# Patient Record
Sex: MALE | Race: WHITE | Employment: PART TIME | URBAN - METROPOLITAN AREA
[De-identification: names, ages, dates, MRNs, and addresses within clinical notes are randomized per-mention and may not be internally consistent; named-entity substitution may affect disease eponyms.]

---

## 2018-11-06 ENCOUNTER — OFFICE VISIT (OUTPATIENT)
Dept: CARDIOLOGY CLINIC | Age: 78
End: 2018-11-06
Payer: COMMERCIAL

## 2018-11-06 VITALS
SYSTOLIC BLOOD PRESSURE: 122 MMHG | RESPIRATION RATE: 16 BRPM | HEART RATE: 60 BPM | HEIGHT: 66 IN | BODY MASS INDEX: 25.05 KG/M2 | WEIGHT: 155.9 LBS | DIASTOLIC BLOOD PRESSURE: 78 MMHG

## 2018-11-06 DIAGNOSIS — I25.119 CORONARY ARTERY DISEASE WITH ANGINA PECTORIS, UNSPECIFIED VESSEL OR LESION TYPE, UNSPECIFIED WHETHER NATIVE OR TRANSPLANTED HEART (HCC): Primary | ICD-10-CM

## 2018-11-06 PROCEDURE — 99213 OFFICE O/P EST LOW 20 MIN: CPT | Performed by: INTERNAL MEDICINE

## 2018-11-06 PROCEDURE — 93000 ELECTROCARDIOGRAM COMPLETE: CPT | Performed by: INTERNAL MEDICINE

## 2018-11-06 RX ORDER — AZELASTINE HYDROCHLORIDE 0.5 MG/ML
SOLUTION/ DROPS OPHTHALMIC
COMMUNITY
Start: 2018-10-03 | End: 2020-06-17 | Stop reason: ALTCHOICE

## 2018-11-06 NOTE — PROGRESS NOTES
allergies, problem list and results of all previously ordered testing were reviewed at today's visit.   Luis Rosas MD  [facility]) Cardiology

## 2019-08-20 ENCOUNTER — APPOINTMENT (OUTPATIENT)
Dept: GENERAL RADIOLOGY | Age: 79
End: 2019-08-20
Payer: MEDICARE

## 2019-08-20 ENCOUNTER — HOSPITAL ENCOUNTER (EMERGENCY)
Age: 79
Discharge: HOME | End: 2019-08-20
Attending: EMERGENCY MEDICINE
Payer: MEDICARE

## 2019-08-20 VITALS
RESPIRATION RATE: 20 BRPM | HEIGHT: 66 IN | OXYGEN SATURATION: 91 % | WEIGHT: 151 LBS | DIASTOLIC BLOOD PRESSURE: 74 MMHG | SYSTOLIC BLOOD PRESSURE: 131 MMHG | HEART RATE: 88 BPM | BODY MASS INDEX: 24.27 KG/M2 | TEMPERATURE: 98.1 F

## 2019-08-20 DIAGNOSIS — J44.1 COPD EXACERBATION (HCC): Primary | ICD-10-CM

## 2019-08-20 PROCEDURE — 93005 ELECTROCARDIOGRAM TRACING: CPT | Performed by: EMERGENCY MEDICINE

## 2019-08-20 PROCEDURE — 6370000000 HC RX 637 (ALT 250 FOR IP)

## 2019-08-20 PROCEDURE — 99285 EMERGENCY DEPT VISIT HI MDM: CPT

## 2019-08-20 PROCEDURE — 6370000000 HC RX 637 (ALT 250 FOR IP): Performed by: EMERGENCY MEDICINE

## 2019-08-20 PROCEDURE — 71046 X-RAY EXAM CHEST 2 VIEWS: CPT

## 2019-08-20 RX ORDER — DOXYCYCLINE HYCLATE 100 MG
100 TABLET ORAL 2 TIMES DAILY
Qty: 20 TABLET | Refills: 0 | Status: SHIPPED | OUTPATIENT
Start: 2019-08-20 | End: 2019-08-30

## 2019-08-20 RX ORDER — IPRATROPIUM BROMIDE AND ALBUTEROL SULFATE 2.5; .5 MG/3ML; MG/3ML
SOLUTION RESPIRATORY (INHALATION)
Status: COMPLETED
Start: 2019-08-20 | End: 2019-08-20

## 2019-08-20 RX ORDER — PREDNISONE 10 MG/1
TABLET ORAL
Qty: 10 TABLET | Refills: 0 | Status: SHIPPED | OUTPATIENT
Start: 2019-08-20 | End: 2019-08-30

## 2019-08-20 RX ORDER — ALBUTEROL SULFATE 2.5 MG/3ML
2.5 SOLUTION RESPIRATORY (INHALATION) EVERY 6 HOURS PRN
Qty: 120 EACH | Refills: 3 | Status: SHIPPED | OUTPATIENT
Start: 2019-08-20

## 2019-08-20 RX ORDER — PREDNISONE 20 MG/1
60 TABLET ORAL ONCE
Status: COMPLETED | OUTPATIENT
Start: 2019-08-20 | End: 2019-08-20

## 2019-08-20 RX ORDER — DOXYCYCLINE HYCLATE 100 MG/1
100 CAPSULE ORAL ONCE
Status: COMPLETED | OUTPATIENT
Start: 2019-08-20 | End: 2019-08-20

## 2019-08-20 RX ORDER — IPRATROPIUM BROMIDE AND ALBUTEROL SULFATE 2.5; .5 MG/3ML; MG/3ML
1 SOLUTION RESPIRATORY (INHALATION) ONCE
Status: COMPLETED | OUTPATIENT
Start: 2019-08-20 | End: 2019-08-20

## 2019-08-20 RX ADMIN — PREDNISONE 60 MG: 20 TABLET ORAL at 13:56

## 2019-08-20 RX ADMIN — IPRATROPIUM BROMIDE AND ALBUTEROL SULFATE 1 AMPULE: 2.5; .5 SOLUTION RESPIRATORY (INHALATION) at 13:45

## 2019-08-20 RX ADMIN — DOXYCYCLINE HYCLATE 100 MG: 100 CAPSULE ORAL at 14:26

## 2019-08-20 RX ADMIN — IPRATROPIUM BROMIDE AND ALBUTEROL SULFATE 1 AMPULE: .5; 3 SOLUTION RESPIRATORY (INHALATION) at 13:45

## 2019-08-21 LAB
EKG ATRIAL RATE: 89 BPM
EKG P AXIS: 25 DEGREES
EKG P-R INTERVAL: 154 MS
EKG Q-T INTERVAL: 368 MS
EKG QRS DURATION: 88 MS
EKG QTC CALCULATION (BAZETT): 447 MS
EKG R AXIS: 34 DEGREES
EKG T AXIS: 12 DEGREES
EKG VENTRICULAR RATE: 89 BPM

## 2019-08-21 PROCEDURE — 93010 ELECTROCARDIOGRAM REPORT: CPT | Performed by: INTERNAL MEDICINE

## 2020-05-28 ENCOUNTER — TELEPHONE (OUTPATIENT)
Dept: ADMINISTRATIVE | Age: 80
End: 2020-05-28

## 2020-06-17 ENCOUNTER — OFFICE VISIT (OUTPATIENT)
Dept: CARDIOLOGY CLINIC | Age: 80
End: 2020-06-17
Payer: MEDICARE

## 2020-06-17 VITALS
HEART RATE: 63 BPM | BODY MASS INDEX: 24.85 KG/M2 | HEIGHT: 66 IN | SYSTOLIC BLOOD PRESSURE: 118 MMHG | WEIGHT: 154.6 LBS | DIASTOLIC BLOOD PRESSURE: 80 MMHG | TEMPERATURE: 97.2 F

## 2020-06-17 PROCEDURE — 1123F ACP DISCUSS/DSCN MKR DOCD: CPT | Performed by: INTERNAL MEDICINE

## 2020-06-17 PROCEDURE — 99214 OFFICE O/P EST MOD 30 MIN: CPT | Performed by: INTERNAL MEDICINE

## 2020-06-17 PROCEDURE — G8427 DOCREV CUR MEDS BY ELIG CLIN: HCPCS | Performed by: INTERNAL MEDICINE

## 2020-06-17 PROCEDURE — 1036F TOBACCO NON-USER: CPT | Performed by: INTERNAL MEDICINE

## 2020-06-17 PROCEDURE — 4040F PNEUMOC VAC/ADMIN/RCVD: CPT | Performed by: INTERNAL MEDICINE

## 2020-06-17 PROCEDURE — G8420 CALC BMI NORM PARAMETERS: HCPCS | Performed by: INTERNAL MEDICINE

## 2020-06-17 PROCEDURE — 93000 ELECTROCARDIOGRAM COMPLETE: CPT | Performed by: INTERNAL MEDICINE

## 2020-06-17 NOTE — PATIENT INSTRUCTIONS
1. ontinue current medical regimen. 2. We will obtain latest lipid panel results from Dr. Aki Hernandez office  3. Will schedule him for an echocardiogram to evaluate exertional dyspnea and to rule out LV dysfunction. 4. He will follow up with me in one year.

## 2020-06-17 NOTE — PROGRESS NOTES
OUTPATIENT CARDIOLOGY FOLLOW-UP    Name: Ros Del Cid    Age: 70 y.o. Primary Care Physician: Ariana Valencia    Date of Service: 6/17/2020    Chief Complaint:   Chief Complaint   Patient presents with    Coronary Artery Disease     Patient complains of sob. Interim History:   Mr. Hosea Arredondo is a 56-year-old gentleman history of hypertension, hyperlipidemia, coronary artery disease status post acute MI presented in normal 2010. He underwent cardiac cath and followed by PCI and stent to RCA. He had an echocardiogram in August 2011 which showed mild LV dysfunction with incomplete to recover of LV function on subsequent echocardiograms. He has had a acute diverticulitis with the perforated colon underwent the surgery in November 2014. He was also diagnosed with COPD and that makes him shortness of breath with exertion and feels better with inhalers. He quit smoking in 2007. He was told that his PSA was high on recent blood work. He is going to follow up at [facility] for that. He was seen in the office 11/6/2018., since his last visit, he was admitted to the hospital on 8/20/2019 with a COPD exacerbation. He noticed increasing dyspnea with exertion without orthopnea PND or leg edema. He thinks COPD is getting worse and is requiring increased frequency of inhalers. He is compliant with medications, as well as salt and fluid intake. He does not take any over-the-counter arthritis medications. No new cardiac complaints since last cardiology evaluation. She denies recent chest pain, SOB, palpitations, lightheadedness, dizziness, syncope, PND, or orthopnea. SR on EKG.     Review of Systems:   Cardiac: As per HPI  General: No fever, chills  Pulmonary: As per HPI  HEENT: No visual disturbances, difficult swallowing  GI: No nausea, vomiting  Endocrine: No thyroid disease or DM  Musculoskeletal: BECK[place] x 4, no focal motor deficits  Skin: Intact, no rashes  Neuro/Psych: No headache or seizures    Past Medical History:  Past Medical History:   Diagnosis Date    Atypical chest pain 11/5/10    presented to ER    CAD (coronary artery disease)     follows with dr. Kalyan [facility]) 2004    skin    COPD (chronic obstructive pulmonary disease) ([facility] [address]     H/O diverticulitis of colon     Hyperlipidemia     Hypertension     Preoperative clearance 10-14-14    Cardiac clearance from Dr. Antelmo Seals for OR 10-20-14 on chart    Travel foreign     no       Past Surgical History:  Past Surgical History:   Procedure Laterality Date    COLECTOMY  3/19/14    Dr. Lg Fermin, [facility], Kavya's procedure (sigmoid colectomy with end colostomy)    COLONOSCOPY      DIAGNOSTIC CARDIAC CATH LAB PROCEDURE  11/6/10    HERNIA REPAIR      OTHER SURGICAL HISTORY  10/20/14    lap reversal of colostomy       Family History:  Family History   Problem Relation Age of Onset    Heart Disease Father 61       Social History:  Social History     Socioeconomic History    Marital status:      Spouse name: Not on file    Number of children: Not on file    Years of education: Not on file    Highest education level: Not on file   Occupational History    Not on file   Social Needs    Financial resource strain: Not on file    Food insecurity     Worry: Not on file     Inability: Not on file    Transportation needs     Medical: Not on file     Non-medical: Not on file   Tobacco Use    Smoking status: Former Smoker     Packs/day: 1.00     Years: 50.00     Pack years: 50.00     Types: Cigarettes     Last attempt to quit: 2007     Years since quittin.4    Smokeless tobacco: Former User     Quit date: 2007   Substance and Sexual Activity    Alcohol use: Yes     Comment: socially    Drug use: No    Sexual activity: Not on file   Lifestyle    Physical activity     Days per week: Not on file     Minutes per session: Not on file    Stress: Not on file   Relationships    Social connections     Talks on phone: Not on file     Gets (70.1 kg)   BMI 24.95 kg/m²   Wt Readings from Last 3 Encounters:   06/17/20 154 lb 9.6 oz (70.1 kg)   08/20/19 151 lb (68.5 kg)   11/06/18 155 lb 14.4 oz (70.7 kg)     Appearance: Awake, alert and oriented x 3, no acute respiratory distress  Skin: Intact, no rash  Head: Normocephalic, atraumatic  Eyes: EOMI, no conjunctival erythema  ENMT: No pharyngeal erythema, MMM, no rhinorrhea  Neck: Supple, no elevated JVP, no carotid bruits  Lungs: Clear to auscultation bilaterally. No wheezes, rales, or rhonchi.   Cardiac: Regular rate and rhythm, +S1S2, no murmurs apparent  Abdomen: Soft, nontender, +bowel sounds  Extremities: Moves all extremities x 4, no lower extremity edema  Neurologic: No focal motor deficits apparent, normal mood and affect, alert and oriented x 3  Peripheral Pulses: Intact posterior tibial pulses bilaterally    Laboratory Tests:  Lab Results   Component Value Date    CREATININE 1.0 12/29/2015    BUN 20 12/29/2015     12/29/2015    K 4.8 12/29/2015     12/29/2015    CO2 28 12/29/2015     Lab Results   Component Value Date    MG 2.1 03/24/2014     Lab Results   Component Value Date    WBC 6.8 12/29/2015    HGB 14.2 12/29/2015    HCT 42.1 12/29/2015    MCV 89.6 12/29/2015     12/29/2015     Lab Results   Component Value Date    ALT 27 12/29/2015    AST 21 12/29/2015    ALKPHOS 100 12/29/2015    BILITOT 0.6 12/29/2015     Lab Results   Component Value Date    CKTOTAL 1,369 (H) 11/06/2010    CKMB 279.2 (H) 11/06/2010    TROPONINI 31.84 (H) 11/06/2010    TROPONINI 0.16 11/05/2010    TROPONINI 0.20 11/05/2010     Lab Results   Component Value Date    INR 1.2 03/20/2014    INR 1.0 03/19/2014    INR 1.0 11/06/2010    PROTIME 13.3 (H) 03/20/2014    PROTIME 12.0 03/19/2014    PROTIME 11.0 11/06/2010     Lab Results   Component Value Date    TSH 1.790 12/29/2015     Lab Results   Component Value Date    LABA1C 5.7 11/08/2010     No results found for: EAG  Lab Results   Component Value Date CHOL 145 12/29/2015    CHOL 131 11/08/2013    CHOL 178 11/05/2010     Lab Results   Component Value Date    TRIG 67 12/29/2015    TRIG 93 11/08/2013    TRIG 52 11/05/2010     Lab Results   Component Value Date    HDL 72 12/29/2015    HDL 66.0 11/08/2013    HDL 63.0 11/05/2010     Lab Results   Component Value Date    LDLCALC 60 12/29/2015    LDLCALC 46 11/08/2013    LDLCALC 105 (H) 11/05/2010     Lab Results   Component Value Date    LABVLDL 13 12/29/2015     No results found for: CHOLHDLRATIO    Cardiac Tests:  ECG: NSR, normal EKG, no change    Echocardiogram: 8/11/2011-LVEF 50%, trace to mild MR, stage I diastolic dysfunction, normal LV systolic function, and PA systolic pressure was normal.    Exercise nuclear stress test-10/14/2014: DTS-4, is a small fixed defect in the apical lateral wall suggestive of prior infarct, abnormal LV systolic function with an EF of 40%. Cardiac catheterization: 2011  The ASCVD Risk score (Lilibeth Aguirre, et al., 2013) failed to calculate for the following reasons:    Cannot find a previous HDL lab    Cannot find a previous total cholesterol lab        ASSESSMENT:  1. LAWRENCE,new secondary to COPD versus CHF. 2. CAD, status post acute MI underwent PCI and TONG to RCA in 2010  3. Mild LV dysfunction - resolved  4. COPD - stable  5. Hypertension well controlled  6. Hyperlipidemia on statin well controlled  7. Remote history of tobacco use  8. History of perforated diverticulitis status post surgery stable  9. Recent history of elevated PSA secondary to prostatitis. Plan:   1. Continue current medical regimen. 2. We will obtain latest lipid panel results from Dr. Hannah Marshall office  3. Will schedule him for an echocardiogram to evaluate exertional dyspnea and to rule out LV dysfunction. 4. He will follow up with me in one year. The patient's current medication list, allergies, problem list and results of all previously ordered testing were reviewed at today's visit.   Elly Xavier

## 2020-08-31 ENCOUNTER — TELEPHONE (OUTPATIENT)
Dept: CARDIOLOGY | Age: 80
End: 2020-08-31

## 2020-09-02 ENCOUNTER — HOSPITAL ENCOUNTER (OUTPATIENT)
Dept: CARDIOLOGY | Age: 80
Discharge: HOME | End: 2020-09-02
Payer: MEDICARE

## 2020-09-02 LAB
LV EF: 53 %
LVEF MODALITY: NORMAL

## 2020-09-02 PROCEDURE — 93306 TTE W/DOPPLER COMPLETE: CPT

## 2020-09-04 ENCOUNTER — TELEPHONE (OUTPATIENT)
Dept: CARDIOLOGY CLINIC | Age: 80
End: 2020-09-04

## 2020-09-04 NOTE — TELEPHONE ENCOUNTER
----- Message from Gigi Mares MD sent at 9/3/2020  5:08 PM EDT -----  His echocardiogram showed normal LV function mild stiff heart no significant valve lesions.

## 2021-08-29 ENCOUNTER — APPOINTMENT (OUTPATIENT)
Dept: CT IMAGING | Age: 81
End: 2021-08-29
Payer: MEDICARE

## 2021-08-29 ENCOUNTER — HOSPITAL ENCOUNTER (EMERGENCY)
Age: 81
Discharge: HOME | End: 2021-08-29
Attending: FAMILY MEDICINE
Payer: MEDICARE

## 2021-08-29 VITALS
DIASTOLIC BLOOD PRESSURE: 60 MMHG | TEMPERATURE: 97.4 F | BODY MASS INDEX: 24.91 KG/M2 | RESPIRATION RATE: 20 BRPM | SYSTOLIC BLOOD PRESSURE: 196 MMHG | OXYGEN SATURATION: 94 % | HEART RATE: 84 BPM | WEIGHT: 155 LBS | HEIGHT: 66 IN

## 2021-08-29 DIAGNOSIS — S20.212A CONTUSION OF RIB ON LEFT SIDE, INITIAL ENCOUNTER: Primary | ICD-10-CM

## 2021-08-29 PROCEDURE — 99283 EMERGENCY DEPT VISIT LOW MDM: CPT

## 2021-08-29 PROCEDURE — 71250 CT THORAX DX C-: CPT

## 2021-08-29 PROCEDURE — 6370000000 HC RX 637 (ALT 250 FOR IP): Performed by: FAMILY MEDICINE

## 2021-08-29 RX ORDER — ACETAMINOPHEN 500 MG
1000 TABLET ORAL ONCE
Status: COMPLETED | OUTPATIENT
Start: 2021-08-29 | End: 2021-08-29

## 2021-08-29 RX ORDER — IBUPROFEN 400 MG/1
400 TABLET ORAL ONCE
Status: COMPLETED | OUTPATIENT
Start: 2021-08-29 | End: 2021-08-29

## 2021-08-29 RX ADMIN — IBUPROFEN 400 MG: 400 TABLET, FILM COATED ORAL at 18:27

## 2021-08-29 RX ADMIN — ACETAMINOPHEN 1000 MG: 500 TABLET ORAL at 18:27

## 2021-08-29 ASSESSMENT — PAIN SCALES - GENERAL
PAINLEVEL_OUTOF10: 9
PAINLEVEL_OUTOF10: 9

## 2021-08-29 NOTE — ED PROVIDER NOTES
[address] [facility]  Department of Emergency Medicine   ED  Encounter Note  Admit Date/RoomTime: 2021  3:56 PM  ED Room:     NAME: Segundo Mccurdy  : 1940  MRN: [id number]     Chief Complaint:  Fall (tripped over dog last night and c/o left side back/rib pain landed on floor, no loc, no head injury, no other c/o)    History of Present Illness        Segundo Mccurdy is a 68 y.o. old male who presents to the emergency department by private vehicle, for traumatic aching left chest and left lateral chest pain which occured 1 day(s) prior to arrival. Patient has no prior history of pain/injury with regards to today's visit. The complaint occurred as a result of a fall from tripping while at home. Since onset the symptoms have been persistent. His symptoms are associated with nothing additional. His pain is aggraveated by breathing, touch and chest wall motion and relieved by rest.  He denies any head injury, headache, neck pain, abdominal pain, extremity injury, numbness, weakness, fever, chills, productive cough or dyspnea. Tetanus Status: within past 10 years. ROS   Pertinent positives and negatives are stated within HPI, all other systems reviewed and are negative. Past Medical History:  has a past medical history of Atypical chest pain, CAD (coronary artery disease), Cancer ([facility] [address] COPD (chronic obstructive pulmonary disease) ([facility] [address] H/O diverticulitis of colon, Hyperlipidemia, Hypertension, Preoperative clearance, and Travel foreign. Surgical History:  has a past surgical history that includes Diagnostic Cardiac Cath Lab Procedure (11/6/10); hernia repair; Colonoscopy; colectomy (3/19/14); and other surgical history (10/20/14). Social History:  reports that he quit smoking about 14 years ago. His smoking use included cigarettes. He has a 50.00 pack-year smoking history. He quit smokeless tobacco use about 14 years ago. He reports current alcohol use.  He reports that he does not use drugs. Family History: family history includes Heart Disease (age of onset: 61) in his father. Allergies: Patient has no known allergies. Physical Exam   Oxygen Saturation Interpretation: Normal.        ED Triage Vitals [08/29/21 1601]   BP Temp Temp src Pulse Resp SpO2 Height Weight   (!) 196/60 97.4 °F (36.3 °C) -- 84 20 94 % 5' 6\" (1.676 m) 155 lb (70.3 kg)         Constitutional:  Alert, development consistent with age. HEENT:  NC/NT. Airway patent. Neck:  Normal ROM. Supple. Respiratory:  Clear to auscultation and breath sounds equal.  CV:  Regular rate and rhythm, normal heart sounds, without pathological murmurs, ectopy, gallops, or rubs. Chest:  left sided tender to palpation, which does reproduce pain. Crepitance: No.          Skin:  no wounds, erythema, or swelling. GI:  Abdomen Soft, nontender, good bowel sounds. No firm or pulsatile mass. Integument:  Normal turgor. Warm, dry, without visible rash, unless noted elsewhere. Extremities: No tenderness or edema noted. Neurological:  Oriented. Motor functions intact. Lab / Imaging Results   (All laboratory and radiology results have been personally reviewed by myself)  Labs:  No results found for this visit on 08/29/21. Imaging: All Radiology results interpreted by Radiologist unless otherwise noted. CT CHEST WO CONTRAST   Final Result   Unremarkable CT scan chest.  No findings to explain the patient's left chest   wall and.             ED Course / Medical Decision Making     Medications   acetaminophen (TYLENOL) tablet 1,000 mg (1,000 mg Oral Given 8/29/21 1827)   ibuprofen (ADVIL;MOTRIN) tablet 400 mg (400 mg Oral Given 8/29/21 1827)        Re-examination:  8/29/21       Time:   Patients condition is improving after treatment. Consult(s):   None    Procedure(s):   none    MDM: Imaging was obtained based on moderate suspicion for fracture / bony abnormalityas per history/physical findings.     Plan of Care/Counseling:  Miller Ortiz MD reviewed today's visit with the patient in addition to providing specific details for the plan of care and counseling regarding the diagnosis and prognosis. Questions are answered at this time and are agreeable with the plan. Advised on incentive spirometry  Assessment     1. Contusion of rib on left side, initial encounter      Plan   Discharged home. Patient condition is good    New Medications     Discharge Medication List as of 8/29/2021  6:29 PM        Electronically signed by Miller Ortiz MD   DD: 8/29/21  **This report was transcribed using voice recognition software. Every effort was made to ensure accuracy; however, inadvertent computerized transcription errors may be present.   END OF ED PROVIDER NOTE        Miller Ortiz MD  09/02/21 1376

## 2022-04-03 ENCOUNTER — APPOINTMENT (OUTPATIENT)
Dept: CT IMAGING | Age: 82
End: 2022-04-03
Payer: MEDICARE

## 2022-04-03 ENCOUNTER — HOSPITAL ENCOUNTER (EMERGENCY)
Age: 82
Discharge: HOME | End: 2022-04-03
Attending: EMERGENCY MEDICINE
Payer: MEDICARE

## 2022-04-03 VITALS
HEART RATE: 93 BPM | OXYGEN SATURATION: 99 % | RESPIRATION RATE: 16 BRPM | BODY MASS INDEX: 24.27 KG/M2 | TEMPERATURE: 97.5 F | SYSTOLIC BLOOD PRESSURE: 141 MMHG | DIASTOLIC BLOOD PRESSURE: 68 MMHG | HEIGHT: 66 IN | WEIGHT: 151 LBS

## 2022-04-03 DIAGNOSIS — R31.9 URINARY TRACT INFECTION WITH HEMATURIA, SITE UNSPECIFIED: Primary | ICD-10-CM

## 2022-04-03 DIAGNOSIS — N39.0 URINARY TRACT INFECTION WITH HEMATURIA, SITE UNSPECIFIED: Primary | ICD-10-CM

## 2022-04-03 DIAGNOSIS — Z96.0 S/P URETERAL STENT PLACEMENT: ICD-10-CM

## 2022-04-03 LAB
ANION GAP SERPL CALCULATED.3IONS-SCNC: 12 MMOL/L (ref 7–16)
BACTERIA: ABNORMAL /HPF
BASOPHILS ABSOLUTE: 0.01 E9/L (ref 0–0.2)
BASOPHILS RELATIVE PERCENT: 0.1 % (ref 0–2)
BILIRUBIN URINE: NEGATIVE
BLOOD, URINE: ABNORMAL
BUN BLDV-MCNC: 38 MG/DL (ref 6–23)
BURR CELLS: ABNORMAL
CALCIUM SERPL-MCNC: 8.8 MG/DL (ref 8.6–10.2)
CHLORIDE BLD-SCNC: 97 MMOL/L (ref 98–107)
CLARITY: CLEAR
CO2: 23 MMOL/L (ref 22–29)
COLOR: ABNORMAL
CREAT SERPL-MCNC: 1.1 MG/DL (ref 0.7–1.2)
EOSINOPHILS ABSOLUTE: 0 E9/L (ref 0.05–0.5)
EOSINOPHILS RELATIVE PERCENT: 0 % (ref 0–6)
GFR AFRICAN AMERICAN: >60
GFR NON-AFRICAN AMERICAN: >60 ML/MIN/1.73
GLUCOSE BLD-MCNC: 113 MG/DL (ref 74–99)
GLUCOSE URINE: 100 MG/DL
HCT VFR BLD CALC: 34.2 % (ref 37–54)
HEMOGLOBIN: 11 G/DL (ref 12.5–16.5)
IMMATURE GRANULOCYTES #: 0.14 E9/L
IMMATURE GRANULOCYTES %: 1.2 % (ref 0–5)
INFLUENZA A BY PCR: NOT DETECTED
INFLUENZA B BY PCR: NOT DETECTED
KETONES, URINE: 40 MG/DL
LEUKOCYTE ESTERASE, URINE: ABNORMAL
LYMPHOCYTES ABSOLUTE: 0.26 E9/L (ref 1.5–4)
LYMPHOCYTES RELATIVE PERCENT: 2.3 % (ref 20–42)
MCH RBC QN AUTO: 28.8 PG (ref 26–35)
MCHC RBC AUTO-ENTMCNC: 32.2 % (ref 32–34.5)
MCV RBC AUTO: 89.5 FL (ref 80–99.9)
MONOCYTES ABSOLUTE: 1.16 E9/L (ref 0.1–0.95)
MONOCYTES RELATIVE PERCENT: 10.3 % (ref 2–12)
NEUTROPHILS ABSOLUTE: 9.66 E9/L (ref 1.8–7.3)
NEUTROPHILS RELATIVE PERCENT: 86.1 % (ref 43–80)
NITRITE, URINE: POSITIVE
PDW BLD-RTO: 13.8 FL (ref 11.5–15)
PH UA: 5.5 (ref 5–9)
PLATELET # BLD: 198 E9/L (ref 130–450)
PMV BLD AUTO: 10.7 FL (ref 7–12)
POIKILOCYTES: ABNORMAL
POTASSIUM REFLEX MAGNESIUM: 3.6 MMOL/L (ref 3.5–5)
PROTEIN UA: 100 MG/DL
RBC # BLD: 3.82 E12/L (ref 3.8–5.8)
RBC UA: ABNORMAL /HPF (ref 0–2)
SARS-COV-2, NAAT: NOT DETECTED
SODIUM BLD-SCNC: 132 MMOL/L (ref 132–146)
SPECIFIC GRAVITY UA: 1.02 (ref 1–1.03)
UROBILINOGEN, URINE: 1 E.U./DL
WBC # BLD: 11.2 E9/L (ref 4.5–11.5)
WBC UA: ABNORMAL /HPF (ref 0–5)

## 2022-04-03 PROCEDURE — 87088 URINE BACTERIA CULTURE: CPT

## 2022-04-03 PROCEDURE — 80048 BASIC METABOLIC PNL TOTAL CA: CPT

## 2022-04-03 PROCEDURE — 87502 INFLUENZA DNA AMP PROBE: CPT

## 2022-04-03 PROCEDURE — 96361 HYDRATE IV INFUSION ADD-ON: CPT

## 2022-04-03 PROCEDURE — 87040 BLOOD CULTURE FOR BACTERIA: CPT

## 2022-04-03 PROCEDURE — 87150 DNA/RNA AMPLIFIED PROBE: CPT

## 2022-04-03 PROCEDURE — 6360000002 HC RX W HCPCS: Performed by: EMERGENCY MEDICINE

## 2022-04-03 PROCEDURE — 2580000003 HC RX 258: Performed by: EMERGENCY MEDICINE

## 2022-04-03 PROCEDURE — 87077 CULTURE AEROBIC IDENTIFY: CPT

## 2022-04-03 PROCEDURE — 99284 EMERGENCY DEPT VISIT MOD MDM: CPT

## 2022-04-03 PROCEDURE — 96374 THER/PROPH/DIAG INJ IV PUSH: CPT

## 2022-04-03 PROCEDURE — 87635 SARS-COV-2 COVID-19 AMP PRB: CPT

## 2022-04-03 PROCEDURE — 81001 URINALYSIS AUTO W/SCOPE: CPT

## 2022-04-03 PROCEDURE — 87186 SC STD MICRODIL/AGAR DIL: CPT

## 2022-04-03 PROCEDURE — 85025 COMPLETE CBC W/AUTO DIFF WBC: CPT

## 2022-04-03 PROCEDURE — 2580000003 HC RX 258: Performed by: STUDENT IN AN ORGANIZED HEALTH CARE EDUCATION/TRAINING PROGRAM

## 2022-04-03 PROCEDURE — 74176 CT ABD & PELVIS W/O CONTRAST: CPT

## 2022-04-03 RX ORDER — CEFUROXIME AXETIL 250 MG/1
500 TABLET ORAL 2 TIMES DAILY
Qty: 20 TABLET | Refills: 0 | Status: SHIPPED | OUTPATIENT
Start: 2022-04-03 | End: 2022-04-08

## 2022-04-03 RX ORDER — 0.9 % SODIUM CHLORIDE 0.9 %
1000 INTRAVENOUS SOLUTION INTRAVENOUS ONCE
Status: COMPLETED | OUTPATIENT
Start: 2022-04-03 | End: 2022-04-03

## 2022-04-03 RX ORDER — CEFUROXIME AXETIL 250 MG/1
250 TABLET ORAL 2 TIMES DAILY
Qty: 14 TABLET | Refills: 0 | Status: SHIPPED | OUTPATIENT
Start: 2022-04-03 | End: 2022-04-03 | Stop reason: SDUPTHER

## 2022-04-03 RX ADMIN — SODIUM CHLORIDE 1000 ML: 9 INJECTION, SOLUTION INTRAVENOUS at 08:22

## 2022-04-03 RX ADMIN — CEFTRIAXONE 1000 MG: 1 INJECTION, POWDER, FOR SOLUTION INTRAMUSCULAR; INTRAVENOUS at 10:15

## 2022-04-03 ASSESSMENT — PAIN SCALES - GENERAL
PAINLEVEL_OUTOF10: 0

## 2022-04-03 ASSESSMENT — ENCOUNTER SYMPTOMS
DIARRHEA: 0
VOMITING: 0
ABDOMINAL PAIN: 0
NAUSEA: 0
COUGH: 0
BACK PAIN: 0
SHORTNESS OF BREATH: 0
SORE THROAT: 0
RHINORRHEA: 0

## 2022-04-03 ASSESSMENT — PAIN - FUNCTIONAL ASSESSMENT: PAIN_FUNCTIONAL_ASSESSMENT: 0-10

## 2022-04-03 NOTE — ED PROVIDER NOTES
[address]  Department of Emergency Medicine     Written by: Dennis Nelson DO  Patient Name: Hope Arriaga  Attending Provider: Eduardo Urrutia DO  Admit Date: 4/3/2022  7:32 AM  MRN: [id number]    : 1940        Chief Complaint   Patient presents with    Dysuria     Has a stent placed for stones by  urologist    - Chief complaint    HPI   Hope Arriaga is a 80 y.o. male presenting to the ED for evaluation of Dysuria (Has a stent placed for stones by  urologist)      Patient has a past medical history of renal and bladder stones. He follows with urology at [facility]. States 1 month ago he had surgery to remove bladder stones. 2 weeks ago he had procedure to remove a left-sided kidney stone but states that they were unable to completely do so so they placed a left-sided ureteral stent. Patient had a Knight catheter in place up until 12 days ago. He denies any blood clots in his urine but does state that his urine is orange-colored, states he thinks it is due to the Pyridium he is on. He had a temperature 100.6 °F yesterday afternoon; he has been taking as needed Tylenol for pain. He did take oxycodone 3 days ago for breakthrough pain but none since then. He is scheduled for outpatient preop follow-up tomorrow to have stones removed on Thursday. He came in for evaluation today because the pain has been worsening; patient states he generally feels okay unless he is urinating, then he has significant dysuria and over the past 2 to 3 days his also been having difficulty urinating as well. He denies any flank or abdominal pain at this time. Denies any nausea or vomiting but has been having decreased appetite and decreased p.o. intake. He is not currently on any antibiotics but is due to start them tomorrow preoperatively. His last dose of Tylenol was midnight last night. His complaints are severe in severity, intermittent, much improved when he is not urinating. Pain is aggravated by urinating. Pain is somewhat improved with p.o. Tylenol. Denies any neck pain or stiffness, cough or sore throat, chest pain or palpitations, shortness of breath, abdominal pain, nausea or vomiting, diarrhea, black or bloody stools, numbness or tingling anywhere, lower extremity edema or tenderness. Review of Systems   Constitutional: Positive for appetite change (Decreased) and fever (100.6F yesterday). Negative for chills. HENT: Negative for rhinorrhea and sore throat. Eyes: Negative for visual disturbance. Respiratory: Negative for cough and shortness of breath. Cardiovascular: Negative for chest pain and palpitations. Gastrointestinal: Negative for abdominal pain, diarrhea, nausea and vomiting. Genitourinary: Positive for dysuria. Negative for flank pain and frequency. Musculoskeletal: Negative for back pain and myalgias. Skin: Negative for rash and wound. Neurological: Negative for weakness and headaches. Psychiatric/Behavioral: Negative for confusion. All other systems reviewed and are negative. Physical Exam  Vitals and nursing note reviewed. Constitutional:       General: He is not in acute distress. Appearance: He is not toxic-appearing. HENT:      Head: Normocephalic and atraumatic. Right Ear: External ear normal.      Left Ear: External ear normal.      Nose: Nose normal. No rhinorrhea. Mouth/Throat:      Mouth: Mucous membranes are moist.      Pharynx: Oropharynx is clear. Eyes:      Extraocular Movements: Extraocular movements intact. Conjunctiva/sclera: Conjunctivae normal.      Pupils: Pupils are equal, round, and reactive to light. Cardiovascular:      Rate and Rhythm: Regular rhythm. Tachycardia present. Pulses: Normal pulses. Heart sounds: Normal heart sounds. Pulmonary:      Effort: Pulmonary effort is normal. No respiratory distress. Breath sounds: Normal breath sounds. No wheezing or rales. Abdominal:      General: Bowel sounds are normal.      Palpations: Abdomen is soft. Tenderness: There is no abdominal tenderness. There is no right CVA tenderness, left CVA tenderness, guarding or rebound. Musculoskeletal:         General: No tenderness. Normal range of motion. Cervical back: Normal range of motion and neck supple. Right lower leg: No edema. Left lower leg: No edema. Skin:     General: Skin is warm and dry. Capillary Refill: Capillary refill takes less than 2 seconds. Coloration: Skin is not jaundiced or pale. Findings: No rash. Neurological:      General: No focal deficit present. Mental Status: He is alert and oriented to person, place, and time. Sensory: No sensory deficit. Motor: No weakness. Psychiatric:         Mood and Affect: Mood normal.         Behavior: Behavior normal.          Procedures       [facility]     ED Course as of 04/04/22 1316   Sun Apr 03, 2022   1204 Patient doctor is Dr. Blanc Organ urology at [facility]. Consult placed. [VG]      ED Course User Index  [VG] DO PARRIS Hsu    This is a 80 y.o. male presenting for evaluation of left-sided flank pain, dysuria, and fever T-max 100.6 yesterday. Patient has significant recent urologic history, please see HPI for further details. He had a stent placed 2 weeks ago by his urologist in [facility] and is due to follow-up this week for a repeat surgical procedure for kidney stones. On arrival he is alert and oriented, he is in no acute distress, vitals are stable, he is afebrile, he is not toxic in appearance. There is no CVA tenderness on examination right now, patient states that it is starting to let up but his concern now is more so his dysuria. Patient ambulated to and from the bathroom during this encounter without issue and did urinate without issue. Labs obtained and show evidence for UTI; no no leukocytosis.   Given patient's recent surgery and endorsing of fever 100.6 °F yesterday, blood cultures were obtained and are pending at this time. Patient was treated with IV fluids and Rocephin for his symptoms. Consult was placed to patient's urologist at [facility], I spoke directly with specialist who is part of patient's urology group, they recommend maintaining his already scheduled follow-up this week and placing him on cefuroxime 500 mg twice daily. Given that patient's symptoms are improving and he is nontoxic in appearance and has stable vital signs during this encounter, as well as work-up noted above, feel he is stable and appropriate for discharge. Discussed results and plan with the patient, he voiced understanding and is amenable. Strict return precautions were discussed. He will follow up outpatient by calling Dr. Robin Stevens on Zoom call tomorrow morning as scheduled and will also follow-up by going to his surgery appointment on Thursday this week. * * * * * *Please note, at the signing of this note patient's blood cultures have returned positive for Enterococcus; clinical pharmacist is here in the ER today and will be contacting this patient to inform him of these results and have him come back to the ER.* * * * * * * *      I have discussed this patient with my attending, who has seen the patient and agrees with this disposition. Patient was seen and evaluated by myself and my attending Sadiq Green DO. Assessment and Plan discussed with attending provider, please see attestation for final plan of care.       --------------------------------------------- PAST HISTORY ---------------------------------------------  Past Medical History:  has a past medical history of Atypical chest pain, CAD (coronary artery disease), Cancer ([facility] [address] [facility] H/O diverticulitis of colon, Hyperlipidemia, Hypertension, Preoperative clearance, and Travel foreign.     Past Surgical History:  has a past surgical history that includes Diagnostic Cardiac Cath Lab Procedure (11/6/10); hernia repair; Colonoscopy; colectomy (3/19/14); and other surgical history (10/20/14). Social History:  reports that he quit smoking about 15 years ago. His smoking use included cigarettes. He has a 50.00 pack-year smoking history. He quit smokeless tobacco use about 15 years ago. He reports current alcohol use. He reports that he does not use drugs. Family History: family history includes Heart Disease (age of onset: 61) in his father. The patients home medications have been reviewed. Allergies: Patient has no known allergies.     -------------------------------------------------- RESULTS -------------------------------------------------  Labs:  Results for orders placed or performed during the hospital encounter of 04/03/22   Culture, Urine    Specimen: Urine, clean catch   Result Value Ref Range    Urine Culture, Routine <10,000 CFU/mL  Staph species   (A)     Organism Gram positive cocci (A)     Urine Culture, Routine       >100,000 CFU/ml  Identification and sensitivity to follow     COVID-19, Rapid    Specimen: Nasopharyngeal Swab   Result Value Ref Range    SARS-CoV-2, NAAT Not Detected Not Detected   Rapid influenza A/B antigens    Specimen: Nasopharyngeal   Result Value Ref Range    Influenza A by PCR Not Detected Not Detected    Influenza B by PCR Not Detected Not Detected   Culture, Blood 2    Specimen: Blood   Result Value Ref Range    Culture, Blood 2 (A)      Gram stain performed from blood culture bottle media  Gram positive cocci in pairs and chains     Urinalysis with Microscopic   Result Value Ref Range    Color, UA DARK YELLOW (A) Straw/Yellow    Clarity, UA Clear Clear    Glucose, Ur 100 (A) Negative mg/dL    Bilirubin Urine Negative Negative    Ketones, Urine 40 (A) Negative mg/dL    Specific Gravity, UA 1.020 1.005 - 1.030    Blood, Urine LARGE (A) Negative    pH, UA 5.5 5.0 - 9.0    Protein,  (A) Negative mg/dL Urobilinogen, Urine 1.0 <2.0 E.U./dL    Nitrite, Urine POSITIVE (A) Negative    Leukocyte Esterase, Urine MODERATE (A) Negative    WBC, UA 5-10 (A) 0 - 5 /HPF    RBC, UA 2-5 0 - 2 /HPF    Bacteria, UA MANY (A) None Seen /HPF   CBC with Auto Differential   Result Value Ref Range    WBC 11.2 4.5 - 11.5 E9/L    RBC 3.82 3.80 - 5.80 E12/L    Hemoglobin 11.0 (L) 12.5 - 16.5 g/dL    Hematocrit 34.2 (L) 37.0 - 54.0 %    MCV 89.5 80.0 - 99.9 fL    MCH 28.8 26.0 - 35.0 pg    MCHC 32.2 32.0 - 34.5 %    RDW 13.8 11.5 - 15.0 fL    Platelets 259 732 - 417 E9/L    MPV 10.7 7.0 - 12.0 fL    Neutrophils % 86.1 (H) 43.0 - 80.0 %    Immature Granulocytes % 1.2 0.0 - 5.0 %    Lymphocytes % 2.3 (L) 20.0 - 42.0 %    Monocytes % 10.3 2.0 - 12.0 %    Eosinophils % 0.0 0.0 - 6.0 %    Basophils % 0.1 0.0 - 2.0 %    Neutrophils Absolute 9.66 (H) 1.80 - 7.30 E9/L    Immature Granulocytes # 0.14 E9/L    Lymphocytes Absolute 0.26 (L) 1.50 - 4.00 E9/L    Monocytes Absolute 1.16 (H) 0.10 - 0.95 E9/L    Eosinophils Absolute 0.00 (L) 0.05 - 0.50 E9/L    Basophils Absolute 0.01 0.00 - 0.20 E9/L    Poikilocytes 2+     Jaden Cells 2+    Basic Metabolic Panel w/ Reflex to MG   Result Value Ref Range    Sodium 132 132 - 146 mmol/L    Potassium reflex Magnesium 3.6 3.5 - 5.0 mmol/L    Chloride 97 (L) 98 - 107 mmol/L    CO2 23 22 - 29 mmol/L    Anion Gap 12 7 - 16 mmol/L    Glucose 113 (H) 74 - 99 mg/dL    BUN 38 (H) 6 - 23 mg/dL    CREATININE 1.1 0.7 - 1.2 mg/dL    GFR Non-African American >60 >=60 mL/min/1.73    GFR African American >60     Calcium 8.8 8.6 - 10.2 mg/dL   Blood ID, Molecular   Result Value Ref Range    Bottle Type Anaerobic     Source of Blood Culture No site given     Order Number [id number]     Acinetobacter calcoac baumannii complex by PCR Not Detected Not Detected    Bacteroides fragilis by PCR Not Detected Not Detected    Enterobacteriaceae by PCR Not Detected Not Detected    Enterobacter cloacae complex by PCR Not Detected Not Detected    Enterococcus faecalis by PCR DETECTED (AA) Not Detected    Enterococcus faecium by PCR Not Detected Not Detected    Escherichia coli by PCR Not Detected Not Detected    Haemophilus Influenzae by PCR Not Detected Not Detected    Klebsiella aerogenes by PCR Not Detected Not Detected    Klebsiella oxytoca by PCR Not Detected Not Detected    Klebsiella pneumoniae group by PCR Not Detected Not Detected    Listeria monocytogenes by PCR Not Detected Not Detected    Neisseria meningitidis by PCR Not Detected Not Detected    Proteus species by PCR Not Detected Not Detected    Pseudomonas aeruginosa by PCR Not Detected Not Detected    Salmonella species by PCR Not Detected Not Detected    Streptococcus agalactiae by PCR Not Detected Not Detected    Staphylococcus aureus by PCR Not Detected Not Detected    Staphylococcus epidermidis by PCR Not Detected Not Detected    Staphylococcus lugdunensis by PCR Not Detected Not Detected    Staphylococcus species by PCR Not Detected Not Detected    Serratia marcescens by PCR Not Detected Not Detected    Streptococcus pneumoniae by PCR Not Detected Not Detected    Streptococcus pyogenes  by PCR Not Detected Not Detected    Streptococcus species by PCR Not Detected Not Detected    Stenotrophomonas maltophilia by PCR Not Detected Not Detected    Candida albicans by PCR Not Detected Not Detected    Candida auris by PCR Not Detected Not Detected    Candida glabrata by PCR Not Detected Not Detected    Candida krusei by PCR Not Detected Not Detected    Candida parapsilosis by PCR Not Detected Not Detected    Candida tropicalis by PCR Not Detected Not Detected    Cryptococcus neoformans/gattii by PCR Not Detected Not Detected    Vancomycin Resistant by PCR Not Detected Not Detected       Radiology:  CT ABDOMEN PELVIS WO CONTRAST Additional Contrast? None   Final Result   Surgical removal of a short segment of sigmoid colon with interval placement   of a left ureteral stent.   There is some stranding identified in the surgical   resection region with multiple clips identified in fluid in the left   pericolic gutter. The left kidney is slightly dilated on today's examination   than when compared to the prior examination with no evidence of obstructing   stones. There is positioning of the stent distally within the bladder and   only within the proximal ureter on the left. Mild perinephric stranding on   the left kidney. Previously noted bladder stones have been removed. Large right inguinal hernia containing bowel and fat with no evidence of   strangulation.               ------------------------- NURSING NOTES AND VITALS REVIEWED ---------------------------  Date / Time Roomed:  4/3/2022  7:32 AM  ED Bed Assignment:  CHAPINCITO/CHAPINCITO    The nursing notes within the ED encounter and vital signs as below have been reviewed. BP (!) 141/68   Pulse 93   Temp 97.5 °F (36.4 °C) (Oral)   Resp 16   Ht 5' 6\" (1.676 m)   Wt 151 lb (68.5 kg)   SpO2 99%   BMI 24.37 kg/m²   Oxygen Saturation Interpretation: Normal      ------------------------------------------ PROGRESS NOTES ------------------------------------------  1:22 PM EDT  I have spoken with the patient and his wife    and discussed todays results, in addition to providing specific details for the plan of care and counseling regarding the diagnosis and prognosis. Their questions are answered at this time and they are agreeable with the plan. I discussed at length with them reasons for immediate return here for re evaluation. They will followup with their urologist and primary care physician by calling their office tomorrow. --------------------------------- ADDITIONAL PROVIDER NOTES ---------------------------------  At this time the patient is without objective evidence of an acute process requiring hospitalization or inpatient management.   They have remained hemodynamically stable throughout their entire ED visit and are stable for discharge with outpatient follow-up. The plan has been discussed in detail and they are aware of the specific conditions for emergent return, as well as the importance of follow-up. Discharge Medication List as of 4/3/2022  1:14 PM          Diagnosis:  1. Urinary tract infection with hematuria, site unspecified    2. S/P ureteral stent placement        Disposition:  Patient's disposition: Discharge to home  Patient's condition is stable.        Juan Tena, DO  Resident  04/04/22 0850

## 2022-04-04 LAB
ACINETOBACTER CALCOAC BAUMANNII COMPLEX BY PCR: NOT DETECTED
BACTEROIDES FRAGILIS BY PCR: NOT DETECTED
BOTTLE TYPE: ABNORMAL
CANDIDA ALBICANS BY PCR: NOT DETECTED
CANDIDA AURIS BY PCR: NOT DETECTED
CANDIDA GLABRATA BY PCR: NOT DETECTED
CANDIDA KRUSEI BY PCR: NOT DETECTED
CANDIDA PARAPSILOSIS BY PCR: NOT DETECTED
CANDIDA TROPICALIS BY PCR: NOT DETECTED
CRYPTOCOCCUS NEOFORMANS/GATTII BY PCR: NOT DETECTED
ENTEROBACTER CLOACAE COMPLEX BY PCR: NOT DETECTED
ENTEROBACTERALES BY PCR: NOT DETECTED
ENTEROCOCCUS FAECALIS BY PCR: DETECTED
ENTEROCOCCUS FAECIUM BY PCR: NOT DETECTED
ESCHERICHIA COLI BY PCR: NOT DETECTED
HAEMOPHILUS INFLUENZAE BY PCR: NOT DETECTED
KLEBSIELLA AEROGENES BY PCR: NOT DETECTED
KLEBSIELLA OXYTOCA BY PCR: NOT DETECTED
KLEBSIELLA PNEUMONIAE GROUP BY PCR: NOT DETECTED
LISTERIA MONOCYTOGENES BY PCR: NOT DETECTED
NEISSERIA MENINGITIDIS BY PCR: NOT DETECTED
ORDER NUMBER: ABNORMAL
PROTEUS SPECIES BY PCR: NOT DETECTED
PSEUDOMONAS AERUGINOSA BY PCR: NOT DETECTED
SALMONELLA SPECIES BY PCR: NOT DETECTED
SERRATIA MARCESCENS BY PCR: NOT DETECTED
SOURCE OF BLOOD CULTURE: ABNORMAL
STAPHYLOCOCCUS AUREUS BY PCR: NOT DETECTED
STAPHYLOCOCCUS EPIDERMIDIS BY PCR: NOT DETECTED
STAPHYLOCOCCUS LUGDUNENSIS BY PCR: NOT DETECTED
STAPHYLOCOCCUS SPECIES BY PCR: NOT DETECTED
STENOTROPHOMONAS MALTOPHILIA BY PCR: NOT DETECTED
STREPTOCOCCUS AGALACTIAE BY PCR: NOT DETECTED
STREPTOCOCCUS PNEUMONIAE BY PCR: NOT DETECTED
STREPTOCOCCUS PYOGENES  BY PCR: NOT DETECTED
STREPTOCOCCUS SPECIES BY PCR: NOT DETECTED
VANCOMYCIN RESISTANT BY PCR: NOT DETECTED

## 2022-04-05 LAB — URINE CULTURE, ROUTINE: NORMAL

## 2022-04-08 LAB — BLOOD CULTURE, ROUTINE: NORMAL

## 2022-04-09 LAB — ORGANISM: ABNORMAL

## 2023-02-14 ENCOUNTER — APPOINTMENT (OUTPATIENT)
Dept: CT IMAGING | Age: 83
End: 2023-02-14
Payer: MEDICARE

## 2023-02-14 ENCOUNTER — HOSPITAL ENCOUNTER (EMERGENCY)
Age: 83
Discharge: HOME | End: 2023-02-15
Attending: EMERGENCY MEDICINE
Payer: MEDICARE

## 2023-02-14 ENCOUNTER — APPOINTMENT (OUTPATIENT)
Dept: GENERAL RADIOLOGY | Age: 83
End: 2023-02-14
Payer: MEDICARE

## 2023-02-14 DIAGNOSIS — R06.02 SHORTNESS OF BREATH: ICD-10-CM

## 2023-02-14 DIAGNOSIS — U07.1 COVID-19: Primary | ICD-10-CM

## 2023-02-14 DIAGNOSIS — R06.2 WHEEZING: ICD-10-CM

## 2023-02-14 LAB
ANION GAP SERPL CALCULATED.3IONS-SCNC: 12 MMOL/L (ref 7–16)
ANISOCYTOSIS: ABNORMAL
BASOPHILS ABSOLUTE: 0.09 E9/L (ref 0–0.2)
BASOPHILS RELATIVE PERCENT: 1.1 % (ref 0–2)
BUN BLDV-MCNC: 19 MG/DL (ref 6–23)
BURR CELLS: ABNORMAL
CALCIUM SERPL-MCNC: 9.4 MG/DL (ref 8.6–10.2)
CHLORIDE BLD-SCNC: 102 MMOL/L (ref 98–107)
CO2: 23 MMOL/L (ref 22–29)
CREAT SERPL-MCNC: 0.9 MG/DL (ref 0.7–1.2)
D DIMER: 254 NG/ML DDU
EOSINOPHILS ABSOLUTE: 0.3 E9/L (ref 0.05–0.5)
EOSINOPHILS RELATIVE PERCENT: 3.6 % (ref 0–6)
GFR SERPL CREATININE-BSD FRML MDRD: >60 ML/MIN/1.73
GLUCOSE BLD-MCNC: 115 MG/DL (ref 74–99)
HCT VFR BLD CALC: 38.4 % (ref 37–54)
HEMOGLOBIN: 12.4 G/DL (ref 12.5–16.5)
HYPOCHROMIA: ABNORMAL
IMMATURE GRANULOCYTES #: 0.03 E9/L
IMMATURE GRANULOCYTES %: 0.4 % (ref 0–5)
LYMPHOCYTES ABSOLUTE: 0.55 E9/L (ref 1.5–4)
LYMPHOCYTES RELATIVE PERCENT: 6.6 % (ref 20–42)
MCH RBC QN AUTO: 29.8 PG (ref 26–35)
MCHC RBC AUTO-ENTMCNC: 32.3 % (ref 32–34.5)
MCV RBC AUTO: 92.3 FL (ref 80–99.9)
MONOCYTES ABSOLUTE: 0.85 E9/L (ref 0.1–0.95)
MONOCYTES RELATIVE PERCENT: 10.2 % (ref 2–12)
NEUTROPHILS ABSOLUTE: 6.48 E9/L (ref 1.8–7.3)
NEUTROPHILS RELATIVE PERCENT: 78.1 % (ref 43–80)
PDW BLD-RTO: 13.2 FL (ref 11.5–15)
PLATELET # BLD: 191 E9/L (ref 130–450)
PMV BLD AUTO: 10.2 FL (ref 7–12)
POIKILOCYTES: ABNORMAL
POTASSIUM SERPL-SCNC: 4 MMOL/L (ref 3.5–5)
PRO-BNP: 398 PG/ML (ref 0–450)
RBC # BLD: 4.16 E12/L (ref 3.8–5.8)
SODIUM BLD-SCNC: 137 MMOL/L (ref 132–146)
TARGET CELLS: ABNORMAL
TROPONIN, HIGH SENSITIVITY: 19 NG/L (ref 0–11)
WBC # BLD: 8.3 E9/L (ref 4.5–11.5)

## 2023-02-14 PROCEDURE — 85378 FIBRIN DEGRADE SEMIQUANT: CPT

## 2023-02-14 PROCEDURE — 96374 THER/PROPH/DIAG INJ IV PUSH: CPT

## 2023-02-14 PROCEDURE — 36415 COLL VENOUS BLD VENIPUNCTURE: CPT

## 2023-02-14 PROCEDURE — 99285 EMERGENCY DEPT VISIT HI MDM: CPT

## 2023-02-14 PROCEDURE — 71045 X-RAY EXAM CHEST 1 VIEW: CPT

## 2023-02-14 PROCEDURE — 80048 BASIC METABOLIC PNL TOTAL CA: CPT

## 2023-02-14 PROCEDURE — 85025 COMPLETE CBC W/AUTO DIFF WBC: CPT

## 2023-02-14 PROCEDURE — 96361 HYDRATE IV INFUSION ADD-ON: CPT

## 2023-02-14 PROCEDURE — 83880 ASSAY OF NATRIURETIC PEPTIDE: CPT

## 2023-02-14 PROCEDURE — 6360000002 HC RX W HCPCS: Performed by: EMERGENCY MEDICINE

## 2023-02-14 PROCEDURE — 84484 ASSAY OF TROPONIN QUANT: CPT

## 2023-02-14 PROCEDURE — 2580000003 HC RX 258: Performed by: EMERGENCY MEDICINE

## 2023-02-14 RX ORDER — 0.9 % SODIUM CHLORIDE 0.9 %
1000 INTRAVENOUS SOLUTION INTRAVENOUS ONCE
Status: COMPLETED | OUTPATIENT
Start: 2023-02-14 | End: 2023-02-15

## 2023-02-14 RX ORDER — IPRATROPIUM BROMIDE AND ALBUTEROL SULFATE 2.5; .5 MG/3ML; MG/3ML
1 SOLUTION RESPIRATORY (INHALATION) ONCE
Status: COMPLETED | OUTPATIENT
Start: 2023-02-14 | End: 2023-02-15

## 2023-02-14 RX ORDER — METHYLPREDNISOLONE SODIUM SUCCINATE 125 MG/2ML
125 INJECTION, POWDER, LYOPHILIZED, FOR SOLUTION INTRAMUSCULAR; INTRAVENOUS ONCE
Status: COMPLETED | OUTPATIENT
Start: 2023-02-14 | End: 2023-02-14

## 2023-02-14 RX ADMIN — METHYLPREDNISOLONE SODIUM SUCCINATE 125 MG: 125 INJECTION, POWDER, FOR SOLUTION INTRAMUSCULAR; INTRAVENOUS at 23:55

## 2023-02-14 RX ADMIN — SODIUM CHLORIDE 1000 ML: 9 INJECTION, SOLUTION INTRAVENOUS at 23:55

## 2023-02-14 ASSESSMENT — PAIN DESCRIPTION - LOCATION
LOCATION: RIB CAGE
LOCATION: OTHER (COMMENT)

## 2023-02-14 ASSESSMENT — PAIN DESCRIPTION - PAIN TYPE
TYPE: ACUTE PAIN
TYPE: ACUTE PAIN

## 2023-02-14 ASSESSMENT — PAIN DESCRIPTION - DESCRIPTORS
DESCRIPTORS: ACHING
DESCRIPTORS: ACHING

## 2023-02-14 ASSESSMENT — PAIN DESCRIPTION - ORIENTATION: ORIENTATION: RIGHT

## 2023-02-14 ASSESSMENT — PAIN SCALES - GENERAL: PAINLEVEL_OUTOF10: 9

## 2023-02-14 ASSESSMENT — PAIN - FUNCTIONAL ASSESSMENT
PAIN_FUNCTIONAL_ASSESSMENT: NONE - DENIES PAIN
PAIN_FUNCTIONAL_ASSESSMENT: 0-10
PAIN_FUNCTIONAL_ASSESSMENT: 0-10

## 2023-02-14 ASSESSMENT — PAIN DESCRIPTION - FREQUENCY: FREQUENCY: CONTINUOUS

## 2023-02-15 ENCOUNTER — APPOINTMENT (OUTPATIENT)
Dept: CT IMAGING | Age: 83
End: 2023-02-15
Payer: MEDICARE

## 2023-02-15 VITALS
HEIGHT: 66 IN | HEART RATE: 92 BPM | WEIGHT: 151 LBS | SYSTOLIC BLOOD PRESSURE: 171 MMHG | TEMPERATURE: 98.5 F | RESPIRATION RATE: 22 BRPM | DIASTOLIC BLOOD PRESSURE: 69 MMHG | BODY MASS INDEX: 24.27 KG/M2 | OXYGEN SATURATION: 93 %

## 2023-02-15 LAB — TROPONIN, HIGH SENSITIVITY: 18 NG/L (ref 0–11)

## 2023-02-15 PROCEDURE — 94640 AIRWAY INHALATION TREATMENT: CPT

## 2023-02-15 PROCEDURE — 6360000004 HC RX CONTRAST MEDICATION: Performed by: RADIOLOGY

## 2023-02-15 PROCEDURE — 84484 ASSAY OF TROPONIN QUANT: CPT

## 2023-02-15 PROCEDURE — 6370000000 HC RX 637 (ALT 250 FOR IP): Performed by: EMERGENCY MEDICINE

## 2023-02-15 PROCEDURE — 36415 COLL VENOUS BLD VENIPUNCTURE: CPT

## 2023-02-15 PROCEDURE — 71275 CT ANGIOGRAPHY CHEST: CPT

## 2023-02-15 RX ADMIN — IOPAMIDOL 75 ML: 755 INJECTION, SOLUTION INTRAVENOUS at 00:04

## 2023-02-15 RX ADMIN — IPRATROPIUM BROMIDE AND ALBUTEROL SULFATE 1 AMPULE: .5; 2.5 SOLUTION RESPIRATORY (INHALATION) at 00:17

## 2023-02-15 ASSESSMENT — PAIN - FUNCTIONAL ASSESSMENT
PAIN_FUNCTIONAL_ASSESSMENT: NONE - DENIES PAIN

## 2023-02-15 NOTE — ED NOTES
Pulse at rest 104 and O2 sat 94%  Ambulating hR 112 and pulse ox 93%  After ambulating  and O2 sat 90-91%     Alpa Pina RN  02/14/23 3149

## 2023-02-15 NOTE — FLOWSHEET NOTE
Pt presents to the ED secondary to testing COVID positive today. Pt states that he is currently having a productive cough. Pt is speaking in full sentences. Pt denies any chest pain, SOB or dizziness. Pt ambulates without complication. Pt has no further complaints.

## 2023-02-15 NOTE — PROGRESS NOTES
02/15/23 0017   Treatment   Treatment Type (S)  HHN  (pt. is not able tyo comfortably draw a breath through the respirguard nebulizer, future tx 's should be with an inhaler and spacer.)   $Treatment Type $Inhaled Therapy/Meds   Medications Albuterol/Ipratropium   Breath Sounds Pre-Tx TRAM Expiratory Wheezes; Diminished   Breath Sounds Pre-Tx LLL Expiratory Wheezes; Diminished   Breath Sounds Pre-Tx RUL Diminished; Expiratory Wheezes   Breath Sounds Pre-Tx RML Diminished; Expiratory Wheezes   Breath Sounds Pre-Tx RLL Diminished; Expiratory Wheezes   Oxygen Therapy/Pulse Ox   O2 Device Nasal cannula   O2 Flow Rate (L/min) 2 L/min   SpO2 98 %

## 2023-02-19 ASSESSMENT — ENCOUNTER SYMPTOMS
SORE THROAT: 0
ABDOMINAL PAIN: 0
COUGH: 0
SHORTNESS OF BREATH: 1
DIARRHEA: 0
BACK PAIN: 0
NAUSEA: 0
EYE PAIN: 0
SINUS PRESSURE: 0
EYE DISCHARGE: 0
VOMITING: 0
SPUTUM PRODUCTION: 0
WHEEZING: 1
EYE REDNESS: 0

## 2023-02-19 NOTE — ED PROVIDER NOTES
Patient was seen and examined. He is known to have COVID-19 has had fever and generalized body aches he is noted to be 92% on room air in the waiting room with a history of COPD. He states no chills no nausea vomiting diarrhea abdominal pain urinary symptoms or leg swelling. He reports no other concerns or complaints at this time. Patient was recently on a cruise and developed the symptoms after returning home from the [address] [place] [address]    The history is provided by the patient. Shortness of Breath  Severity:  Mild  Onset quality:  Gradual  Duration:  1 week  Timing:  Intermittent  Progression:  Waxing and waning  Chronicity:  New  Relieved by:  Nothing  Worsened by:  Nothing  Ineffective treatments:  None tried  Associated symptoms: wheezing    Associated symptoms: no abdominal pain, no chest pain, no cough, no diaphoresis, no ear pain, no fever, no headaches, no neck pain, no PND, no rash, no sore throat, no sputum production, no syncope and no vomiting    Risk factors: prolonged immobilization       Review of Systems   Constitutional:  Negative for chills, diaphoresis and fever. HENT:  Negative for ear pain, sinus pressure and sore throat. Eyes:  Negative for pain, discharge and redness. Respiratory:  Positive for shortness of breath and wheezing. Negative for cough and sputum production. Cardiovascular:  Negative for chest pain, syncope and PND. Gastrointestinal:  Negative for abdominal pain, diarrhea, nausea and vomiting. Genitourinary:  Negative for dysuria and frequency. Musculoskeletal:  Negative for arthralgias, back pain and neck pain. Skin:  Negative for rash and wound. Neurological:  Negative for weakness and headaches. Hematological:  Negative for adenopathy. All other systems reviewed and are negative. Physical Exam  Constitutional:       Appearance: Normal appearance. HENT:      Head: Normocephalic and atraumatic.       Nose: Nose normal.   Eyes:      Extraocular Movements: Extraocular movements intact. Pupils: Pupils are equal, round, and reactive to light. Cardiovascular:      Rate and Rhythm: Normal rate and regular rhythm. Pulses: Normal pulses. Heart sounds: Normal heart sounds. Pulmonary:      Breath sounds: Wheezing present. Abdominal:      General: Abdomen is flat. Bowel sounds are normal. There is no distension. Palpations: Abdomen is soft. Tenderness: There is no abdominal tenderness. There is no guarding. Musculoskeletal:         General: Normal range of motion. Cervical back: Normal range of motion and neck supple. Skin:     General: Skin is warm. Capillary Refill: Capillary refill takes less than 2 seconds. Neurological:      General: No focal deficit present. Mental Status: He is alert and oriented to person, place, and time. Procedures     MDM  Number of Diagnoses or Management Options  COVID-19  Shortness of breath  Wheezing  Diagnosis management comments: Patient was seen and examined. He has known diagnosis of COVID-19. In the emergency department labs and imaging were ordered up his oxygen levels were so but he was never under 90% initial work-up led to a CT scan of the chest due to elevated D-dimer it was negative for pulmonary embolism thoracic aneurysm dissection delta troponin was reassuring ultimately the patient was felt to be safe for discharge home with symptomatic treatment. He was provided a pulse oximeter to take home and was instructed to return if oxygen levels drop below 90% consistently. He stated understanding and was discharged to the care of his wife         ED Course as of 02/19/23 3015 [facility] Feb 14, 2023 2228 Patient was seen and examined. Patient is a history of COPD. He recently returned from a cruise in which he started having symptoms of upper respiratory infection symptoms and was diagnosed with COVID-19 after returning home. He reports some shortness of breath and wheezing. Patient on initial evaluation is slightly tachycardic and slightly tachypneic oxygen sats with ambulation between 90 and 94%. He is afebrile. Will initiate patient on breathing treatments and steroids. We will obtain a chest x-ray check basic blood work given patient's recent travel we will additionally add on a D-dimer. [CF]   Wed Feb 15, 2023   0123 Patient had elevated D-dimer so CTA of the chest was ordered and was found to be reassuring. He was ambulated with a pulse ox after breathing treatments [CF]   0123 Was ambulated and did not drop below 90% while being ambulated. Awaiting delta troponin result but plan will be for discharge home if that troponin is reassuring strict return precautions were provided. Patient was provided a pulse oximeter for home to check 3 times daily to evaluate for possible hypoxia. Patient is agreeable plan of discharge and will follow outpatient with primary care physician [CF]      ED Course User Index  [CF] Froilan Mcmullen, DO     --------------------------------------------- PAST HISTORY ---------------------------------------------  Past Medical History:  has a past medical history of Atypical chest pain, CAD (coronary artery disease), Cancer ([facility] [address] COPD (chronic obstructive pulmonary disease) ([facility] [address] H/O diverticulitis of colon, Hyperlipidemia, Hypertension, Preoperative clearance, and Travel foreign. Past Surgical History:  has a past surgical history that includes Diagnostic Cardiac Cath Lab Procedure (11/6/10); hernia repair; Colonoscopy; colectomy (3/19/14); and other surgical history (10/20/14). Social History:  reports that he quit smoking about 16 years ago. His smoking use included cigarettes. He has a 50.00 pack-year smoking history. He quit smokeless tobacco use about 16 years ago. He reports current alcohol use. He reports that he does not use drugs. Family History: family history includes Heart Disease (age of onset: 61) in his father.      The patients home medications have been reviewed. Allergies: Patient has no known allergies.     -------------------------------------------------- RESULTS -------------------------------------------------  Labs:  Results for orders placed or performed during the hospital encounter of 02/14/23   CBC with Auto Differential   Result Value Ref Range    WBC 8.3 4.5 - 11.5 E9/L    RBC 4.16 3.80 - 5.80 E12/L    Hemoglobin 12.4 (L) 12.5 - 16.5 g/dL    Hematocrit 38.4 37.0 - 54.0 %    MCV 92.3 80.0 - 99.9 fL    MCH 29.8 26.0 - 35.0 pg    MCHC 32.3 32.0 - 34.5 %    RDW 13.2 11.5 - 15.0 fL    Platelets 530 374 - 738 E9/L    MPV 10.2 7.0 - 12.0 fL    Neutrophils % 78.1 43.0 - 80.0 %    Immature Granulocytes % 0.4 0.0 - 5.0 %    Lymphocytes % 6.6 (L) 20.0 - 42.0 %    Monocytes % 10.2 2.0 - 12.0 %    Eosinophils % 3.6 0.0 - 6.0 %    Basophils % 1.1 0.0 - 2.0 %    Neutrophils Absolute 6.48 1.80 - 7.30 E9/L    Immature Granulocytes # 0.03 E9/L    Lymphocytes Absolute 0.55 (L) 1.50 - 4.00 E9/L    Monocytes Absolute 0.85 0.10 - 0.95 E9/L    Eosinophils Absolute 0.30 0.05 - 0.50 E9/L    Basophils Absolute 0.09 0.00 - 0.20 E9/L    Anisocytosis 1+     Hypochromia 1+     Poikilocytes 1+     [place] Cells 1+     Target Cells 1+    Basic Metabolic Panel   Result Value Ref Range    Sodium 137 132 - 146 mmol/L    Potassium 4.0 3.5 - 5.0 mmol/L    Chloride 102 98 - 107 mmol/L    CO2 23 22 - 29 mmol/L    Anion Gap 12 7 - 16 mmol/L    Glucose 115 (H) 74 - 99 mg/dL    BUN 19 6 - 23 mg/dL    Creatinine 0.9 0.7 - 1.2 mg/dL    Est, Glom Filt Rate >60 >=60 mL/min/1.73    Calcium 9.4 8.6 - 10.2 mg/dL   Troponin   Result Value Ref Range    Troponin, High Sensitivity 19 (H) 0 - 11 ng/L   D-Dimer, Quantitative   Result Value Ref Range    D-Dimer, Quant 254 ng/mL DDU   Brain Natriuretic Peptide   Result Value Ref Range    Pro- 0 - 450 pg/mL   Troponin   Result Value Ref Range    Troponin, High Sensitivity 18 (H) 0 - 11 ng/L       Radiology:  CTA PULMONARY W CONTRAST   Final Result   1. No pulmonary emboli. 2. No thoracic aortic aneurysm or dissection. Atherosclerosis is seen in the   aorta and coronary arteries. 3. Diffuse emphysematous changes are identified which appear mild, though no   consolidation or pleural effusion. 4. Mild central bronchial thickening may be related to peribronchial edema or   inflammatory change         XR CHEST PORTABLE   Final Result   No acute process. Stable exam.             ------------------------- NURSING NOTES AND VITALS REVIEWED ---------------------------  Date / Time Roomed:  2/14/2023  8:56 PM  ED Bed Assignment:  24/24    The nursing notes within the ED encounter and vital signs as below have been reviewed. BP (!) 171/69   Pulse 92   Temp 98.5 °F (36.9 °C) (Oral)   Resp 22   Ht 5' 6\" (1.676 m)   Wt 151 lb (68.5 kg)   SpO2 93%   BMI 24.37 kg/m²   Oxygen Saturation Interpretation: Normal      ------------------------------------------ PROGRESS NOTES ------------------------------------------  I have spoken with the patient and discussed todays results, in addition to providing specific details for the plan of care and counseling regarding the diagnosis and prognosis. Their questions are answered at this time and they are agreeable with the plan. I discussed at length with them reasons for immediate return here for re evaluation. They will followup with their primary care physician by calling their office on Monday.      --------------------------------- ADDITIONAL PROVIDER NOTES ---------------------------------  At this time the patient is without objective evidence of an acute process requiring hospitalization or inpatient management. They have remained hemodynamically stable throughout their entire ED visit and are stable for discharge with outpatient follow-up.      The plan has been discussed in detail and they are aware of the specific conditions for emergent return, as well as the importance of follow-up. Discharge Medication List as of 2/15/2023  1:23 AM          Diagnosis:  1. COVID-19    2. Shortness of breath    3. Wheezing        Disposition:  Patient's disposition: Discharge to home  Patient's condition is stable.        Harvinder Whitehead DO  02/19/23 1549